# Patient Record
Sex: FEMALE | Race: WHITE | NOT HISPANIC OR LATINO | Employment: STUDENT | URBAN - METROPOLITAN AREA
[De-identification: names, ages, dates, MRNs, and addresses within clinical notes are randomized per-mention and may not be internally consistent; named-entity substitution may affect disease eponyms.]

---
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
## 2024-02-26 NOTE — PATIENT INSTRUCTIONS
"1.  We will not make medication changes today.  I will renew your fluoxetine at the 40 mg level.  I think that makes sense given the fact that you are going to increase your therapy \"dose\" now and we can see what effect that has.  2.  When you meet with your therapist, it may be valuable to talk about the patterns that you are seeing as you record your mood level.  It is also going to be valuable to talk about techniques you can use when you are in a space where you can think and when you are in a space where you are too emotionally overwhelmed to think.  3.  You are doing a great job of \"externalizing\" the voice of your depression and anxiety.  It is great that you are recognizing that as a reflection of depression and anxiety and not your \"true self.\"  Continue to you that internal language to describe what is going on so that you can  from the truth about who you are.  4.  It looks like you are going to have a neuropsychological evaluation on November 10.  That sounds good.  We will look forward to seeing the results of that evaluation.  5.  It sounds like you are using melatonin, 3 mg, approximately once a week when needed.  That is fine.  You can give melatonin nightly if you wish.  6.  We are scheduled for a visit in early December.  When your parents get a chance, they can grab a January visit at a convenient time.  " Detail Level: Detailed Render Risk Assessment In Note?: no Recommendations (Free Text): I have advised patient to stop cleaning products on toilets and use 1:1 vinegar water to wash toilets. I have also recommended he switch to ALL FREE & CLEAR detergent and switch to dove bar soap.

## 2025-02-04 DIAGNOSIS — F41.9 ANXIETY: ICD-10-CM

## 2025-02-04 RX ORDER — FLUOXETINE HYDROCHLORIDE 40 MG/1
40 CAPSULE ORAL DAILY
Qty: 30 CAPSULE | Refills: 6 | Status: CANCELLED | OUTPATIENT
Start: 2025-02-04

## 2025-02-04 NOTE — TELEPHONE ENCOUNTER
Last seen: 07/28/2024  Is note signed Yes  RTC: n/a  Cancel: 0  No-show: 0  Next appt: n/a  Last filled: 11/14/2024 (Qty: 90, 90d)     Incoming refill from pharmacy via fax by pharmacy    Medication requested:   Pending Prescriptions:                       Disp   Refills    FLUoxetine (PROZAC) 40 MG capsule         30 cap*6            Sig: Take 1 capsule (40 mg) by mouth daily.        From chart note:   N/a    Refill sent to [facility] for final review.

## 2025-02-04 NOTE — TELEPHONE ENCOUNTER
Dispensed Days Supply Quantity Provider Pharmacy   FLUOXETINE 40MG CAPSULES 11/14/2024 90 90 Units Daniella Sarmiento MD [facility]...   FLUOXETINE 40MG CAPSULES 07/09/2024 90 90 Units Daniella Sarmiento MD [facility] #...   FLUOXETINE 40MG CAPSULES 03/08/2024 90 90 Units Daniella Sarmiento MD [facility] #...   Patient has not been seen by our clinic (Dr. Castillo) since 7/28/2022- writer confirmed that patients PCP has been managing/prescribing patients Fluoxetine.     Writer called patients pharmacy to notify them that patient is no longer seen at our clinic, therefore, medication refill requests should be sent to patients PCP (Dr. Sarmiento).    Theodora, RN, BSN, PHN  RN Care Coordinator  Pediatric Psychiatry

## 2025-02-10 NOTE — TELEPHONE ENCOUNTER
Daily Note     Today's date: 2/10/2025  Patient name: Lorena Phillips  : 1969  MRN: [id number]  Referring provider: Vance Knox*  Dx:   Encounter Diagnosis     ICD-10-CM    1. Tear of right supraspinatus tendon  M75.101                     Patient was 1 on 1 for 60 mins                       Subjective: Patient reports improvements in shoulder function and no difficulty withHEP.     Objective: See treatment diary below.  Reviewed HEP.   Flexion AROM 178 Abd AROM 178, IR AROM 60 deg       Assessment: Tolerated treatment well. Patient with full AROM and has returned to all regular functional activities.  She reports able to maintain her HEP with consist of regular exercise without difficulty. Patient is now discharged from PT.          Plan: Continue plan of care per protocol.       Precautions: RCR  standard protocol,  PROM only 6 weeks. 90 degrees of flexion, 60 degrees of abduction, 30 degrees of ER at 0, IR to the body       Manuals 11/18 12/4 12/11 12/16 12/23 1/6 1/15 1/22 1/27 2/5 2/10                 PROM PROM             ER and IR PROM   Ms                         Neuro Re-Ed                                                                                                                Ther Ex              Raises Scaption  3 x 12 1# 3 x 12 2# 3x 12 2# 3 x 12 2#  B/l 3 x 12 2# 3 x 12 3#  3 x 12 3#  3 x 8 5#    IR/ER   OTB 3 x 12 BTB 3 x 12  GTB 3 x 12 BTB 3 x 12   Bilateral  3 x 12 bilateral  3 x 12 bilateral  3 x 15 BTB B/L 3 x 20 BTB b/l 3 x 20 BTB 2 x 20 BB   AAROM   IR 10- x 10 IR 10 x 10 cane  IR 10 x 10 IR/ext 10 x 10sec    IR/ et 10 x 10 sec IR Ext 10 x 10 sec  IR ext 10 x 10 sec   IR 10 x 10 sec   Prone Ts           3 x 8 2lbs    Scapular retraction   3 x 10 BTB 3 x 12  MTB MTB 3 x 12 MTB 3 x 12         Prone Rows  3 x 10 3# 3 x 12 3# 3 x 12 4# 3 x 12 4#  3 x 8 8#   3 x 8 8# 3 x 8 #   Chest press       3 x 10 4#   3 x 8 8# 3 x 8 8# 3 x 8 8#    Counter push up        3 x 8 1/2 3 x  Dr. Castillo,     Received a call from Rachael with [facility] in [place], she is asking if she can fill the patients Fluoxetine with regular tablets instead of the PMDD?    Please advise.     Thanks,     Lilly    8  3 x 5 3 x 10    [place] row     3 x 8 9.5# 3 x 8  9.5 # 13# 3 x 8  14# 3 x 8 14# 3 x 8  14# 3 x 8  15# 3 x 8  15# 3 x 8   OH press      3 x 8 2#  3 x 8 5# 3 x 8 5# 3 x 8 8#  3x 8 8# 3 x 8 8#   Ther Activity              OH Raise and hold       3 laps 50 feet 2,3, 4 #   2 laps 10 lbs 2 laps 10lbs                  Gait Training                                          Modalities